# Patient Record
Sex: FEMALE | Race: AMERICAN INDIAN OR ALASKA NATIVE
[De-identification: names, ages, dates, MRNs, and addresses within clinical notes are randomized per-mention and may not be internally consistent; named-entity substitution may affect disease eponyms.]

---

## 2018-10-26 ENCOUNTER — HOSPITAL ENCOUNTER (EMERGENCY)
Dept: HOSPITAL 31 - C.EROB | Age: 18
LOS: 1 days | Discharge: HOME | End: 2018-10-27
Payer: MEDICAID

## 2018-10-26 VITALS — BODY MASS INDEX: 25.7 KG/M2

## 2018-10-26 DIAGNOSIS — B37.3: ICD-10-CM

## 2018-10-26 DIAGNOSIS — O98.812: Primary | ICD-10-CM

## 2018-10-26 DIAGNOSIS — Z3A.27: ICD-10-CM

## 2018-10-26 LAB
BACTERIA #/AREA URNS HPF: (no result) /[HPF]
BILIRUB UR-MCNC: NEGATIVE MG/DL
GLUCOSE UR STRIP-MCNC: NORMAL MG/DL
LEUKOCYTE ESTERASE UR-ACNC: (no result) LEU/UL
PH UR STRIP: 7 [PH] (ref 5–8)
PROT UR STRIP-MCNC: NEGATIVE MG/DL
RBC # UR STRIP: (no result) /UL
SP GR UR STRIP: 1 (ref 1–1.03)
SQUAMOUS EPITHIAL: 23 /HPF (ref 0–5)
UROBILINOGEN UR-MCNC: NORMAL MG/DL (ref 0.2–1)

## 2018-10-26 NOTE — OBDCSUM
===========================

Datetime: 10/26/2018 23:44

===========================

   

Discharged to, Provider:  Home

Follow up at, Provider:  Clinic

Disch Instr Diet:  Regular

Discharge Instructions, Provider:  Routine instructions given

Follow up in weeks, Provider:  scheduled appointment

Disch Activity Restrictions:  No exercising; No lifting; Minimize stair-climbing; No sexual activity;
 Nothing in vagina - [place], tampons, douche

Discharge Comment, Provider:  17 yo female  with an IUP at 27 weeks and here with complaint o
f thick whittish vaginal discharge with severe itching and burning and for the past week. Denies any 
intercourse for months. Admits to adequate FM and denies LOF or VB.

      

   PMHx Negtive

   PSHx TOP

   Meds PNV

   NKDA

   Social Hx Denies x 3

   NST Reactive for GA

   No uterine contractions and cx closed

      

   A/P

   Severe candida vulvovaginitis

   FHT's ressuring for age

   No Labor

   No S_S of UTI

   UA sent and Contaminated with vaginal discharge

   Diflucan 200 mg po given

   Rx for Diflucan 150 mg po x 2 more doses in 4 and in 7th day

   Terazol 7 intravaginally at HS x 7 days and at Perineum BID x 1 week

   Stable

   D/C home with instructions and Rx's

      

Discharge Diagnosis Prov Other:  Candida Vulvovaginitis

## 2018-10-26 NOTE — OBHP
===========================

Datetime: 10/26/2018 23:26

===========================

   

IP Adm Impression:  , intrauterine pregnancy; No Active Labor; Intact Membranes

IP Chief Complaint Other:  Thick, white vaginal discharge

IP Admit Plan:  Observation/Evaluation

Admit Comment, IP Provider:  19 yo female  with an IUP at 27 weeks and here with complaint of
 thick whittish vaginal discharge with severe itching and burning and for the past week. Denies any i
ntercourse for months. Admits to adequate FM and denies LOF or VB.

      

   PMHx Negtive

   PSHx TOP

   Meds PNV

   NKDA

   Social Hx Denies x 3

   NST Reactive for GA

   No uterine contractions and cx closed

      

   A/P

   Severe candida vulvovaginitis

   FHT's ressuring for age

   No Labor

   No S_S of UTI

   UA sent

   Continue Monitoring

Pelvic Type - PN:  Adequate

Extremities - PN:  Normal

Abdomen - PN:  Normal

Back - PN:  Normal

Breast - PN:  Not Done

Lungs - PN:  Normal

Heart - PN:  Normal

Thyroid - PN:  Normal

Neurologic - PN:  Normal

HEENT - PN:  Normal

General - PN:  Normal

FHR - Baseline A Provider:  150

Membranes, Provider:  Intact

Contraction Comments Provider:  0

Comments, ACOG Physical Exam:  Large amount of + Cottage cheese-like vaginal discharge 

   Vaginal and vulva irritation

Gestation - Est Wks by US:  27.0

IP Prenatal Hx Assessment:  No PNC records available

EGA AdmitDate IP:  27.0

Vital Signs Provider:  Reviewed; Within Normal Limits

IP Chief Complaint:  Maternal discomfort; Other

NICHD Variability Prov Fetus A:  Moderate 6-25bpm

NICHD Accel Fetus A IP Provider:  [id number]

FHR Category Provider Fetus A:  Category I

NICHD Decel Fetus A IP Provider:  None

Dilatation, Provider:  0

Effacement, Provider:  0

Station, Provider:  [facility]

Genitourinary Exam:  Normal

DTRs - PN:  Normal

## 2018-10-27 VITALS — HEART RATE: 93 BPM | SYSTOLIC BLOOD PRESSURE: 120 MMHG | DIASTOLIC BLOOD PRESSURE: 61 MMHG

## 2018-11-01 ENCOUNTER — HOSPITAL ENCOUNTER (EMERGENCY)
Dept: HOSPITAL 31 - C.EROB | Age: 18
Discharge: HOME | End: 2018-11-01
Payer: MEDICAID

## 2018-11-01 VITALS — BODY MASS INDEX: 25.7 KG/M2

## 2018-11-01 DIAGNOSIS — Z3A.27: ICD-10-CM
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
## 2018-11-01 DIAGNOSIS — O26.92: Primary | ICD-10-CM
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
## 2018-11-02 VITALS — HEART RATE: 104 BPM | DIASTOLIC BLOOD PRESSURE: 74 MMHG | SYSTOLIC BLOOD PRESSURE: 112 MMHG | TEMPERATURE: 99.1 F
